# Patient Record
Sex: FEMALE | Race: WHITE | NOT HISPANIC OR LATINO
[De-identification: names, ages, dates, MRNs, and addresses within clinical notes are randomized per-mention and may not be internally consistent; named-entity substitution may affect disease eponyms.]

---

## 2022-06-03 ENCOUNTER — APPOINTMENT (OUTPATIENT)
Dept: ORTHOPEDIC SURGERY | Facility: CLINIC | Age: 57
End: 2022-06-03
Payer: COMMERCIAL

## 2022-06-03 VITALS — WEIGHT: 120 LBS | HEIGHT: 60 IN | BODY MASS INDEX: 23.56 KG/M2

## 2022-06-03 DIAGNOSIS — Z78.9 OTHER SPECIFIED HEALTH STATUS: ICD-10-CM

## 2022-06-03 PROBLEM — Z00.00 ENCOUNTER FOR PREVENTIVE HEALTH EXAMINATION: Status: ACTIVE | Noted: 2022-06-03

## 2022-06-03 PROCEDURE — 73562 X-RAY EXAM OF KNEE 3: CPT | Mod: LT

## 2022-06-03 PROCEDURE — 99214 OFFICE O/P EST MOD 30 MIN: CPT

## 2022-06-03 NOTE — PHYSICAL EXAM
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

## 2022-06-03 NOTE — HISTORY OF PRESENT ILLNESS
[Gradual] : gradual [9] : 9 [7] : 7 [Constant] : constant [de-identified] : Following up today on LT knee pain. States her mobility and pain is worse than her last visit Aug 21 2020. NKI. pain has been worse, has been putting off knee replacement. No prior care for Lt. knee. Patient reports using voltarin gel and CBD cream, doesn’t help. Reports clicking locking and giving out [FreeTextEntry1] : LT knee

## 2022-06-03 NOTE — ASSESSMENT
[FreeTextEntry1] : Lengthy discussion regarding options was had with the patient. Nonsurgical options including but not limited to cortisone, viscosupplementation, anti-inflammatory medications, activity modification, obtaining/maintaining a healthy BMI, bracing, and icing were reviewed. Surgical options including but not limited to arthroscopy, and joint replacement were discussed as was risks, benefits and alternatives. All questions were answered.  \par \par Lengthy discussion with the patient was had regarding the potential risks/complications of surgery including but not limited to bleeding, blood clot/dvt, infection, nerve/artery damage, and death. Pt understands and all questions were answered. \par \par Plan is for patient to think about when they would like to go forward with LT. TKA \par \par f/u PRN

## 2022-06-13 ENCOUNTER — APPOINTMENT (OUTPATIENT)
Dept: ORTHOPEDIC SURGERY | Facility: CLINIC | Age: 57
End: 2022-06-13
Payer: COMMERCIAL

## 2022-06-13 PROCEDURE — 99214 OFFICE O/P EST MOD 30 MIN: CPT

## 2022-06-13 NOTE — DISCUSSION/SUMMARY
[de-identified] : The Risks, benefits, alternatives and expectations of the proposed procedure, as well as non-operative management, were discussed at length with the patient, as well as the procedure itself and the expected recovery period. The possible need for post operative Physical Therapy was also discussed. The patient was allowed as much tome as necessary to ask all appropriate questions and they were answered to the patient's satisfaction. \par Risks involving the TKA were discussed and include infection, bleeding, anesthesia complications, NV injury, fracture, dislocation, DVT/PE. \par \par Plan is to go forward with Left TKA

## 2022-06-13 NOTE — HISTORY OF PRESENT ILLNESS
[de-identified] : Patient is here for the left knee. Patient has been seeing Dr. Ramos and was told she needed a TKA. She had Left knee subchondroplasty by Dr Ramos several yrs ago with temp relief.  She states the knee has become worse and severe and she wants to consider TKA.  SHe has severe pain daily and pain is diffuse at the knee.

## 2022-06-13 NOTE — PHYSICAL EXAM
[FreeTextEntry9] : ap/lat/sunrise taken on 6/3/22 show medial joint bone on bone contact, patelalr spurring.

## 2022-07-21 ENCOUNTER — FORM ENCOUNTER (OUTPATIENT)
Age: 57
End: 2022-07-21

## 2022-07-21 ENCOUNTER — RESULT REVIEW (OUTPATIENT)
Age: 57
End: 2022-07-21

## 2022-07-24 ENCOUNTER — FORM ENCOUNTER (OUTPATIENT)
Age: 57
End: 2022-07-24

## 2022-08-09 ENCOUNTER — FORM ENCOUNTER (OUTPATIENT)
Age: 57
End: 2022-08-09

## 2022-08-12 ENCOUNTER — RESULT REVIEW (OUTPATIENT)
Age: 57
End: 2022-08-12

## 2022-08-12 ENCOUNTER — APPOINTMENT (OUTPATIENT)
Dept: ORTHOPEDIC SURGERY | Facility: HOSPITAL | Age: 57
End: 2022-08-12
Payer: COMMERCIAL

## 2022-08-12 PROCEDURE — 27447 TOTAL KNEE ARTHROPLASTY: CPT | Mod: LT

## 2022-08-12 PROCEDURE — 20985 CPTR-ASST DIR MS PX: CPT

## 2022-08-12 PROCEDURE — 27447 TOTAL KNEE ARTHROPLASTY: CPT | Mod: AS,LT

## 2022-08-12 PROCEDURE — 20610 DRAIN/INJ JOINT/BURSA W/O US: CPT | Mod: LT,59

## 2022-08-16 ENCOUNTER — RESULT REVIEW (OUTPATIENT)
Age: 57
End: 2022-08-16

## 2022-08-22 ENCOUNTER — RESULT CHARGE (OUTPATIENT)
Age: 57
End: 2022-08-22

## 2022-08-22 ENCOUNTER — APPOINTMENT (OUTPATIENT)
Dept: ORTHOPEDIC SURGERY | Facility: CLINIC | Age: 57
End: 2022-08-22

## 2022-08-22 PROCEDURE — 73560 X-RAY EXAM OF KNEE 1 OR 2: CPT | Mod: LT

## 2022-08-22 PROCEDURE — 99024 POSTOP FOLLOW-UP VISIT: CPT

## 2022-08-22 NOTE — PHYSICAL EXAM
[Left] : left knee [No loss of surgical correlation. Bony alignment acceptable. Hardware in appropriate position] : No loss of surgical correlation. Bony alignment acceptable. Hardware in appropriate position

## 2022-08-25 RX ORDER — HYDROMORPHONE HYDROCHLORIDE 2 MG/1
2 TABLET ORAL
Qty: 28 | Refills: 0 | Status: ACTIVE | COMMUNITY
Start: 2022-08-25 | End: 1900-01-01

## 2022-08-31 ENCOUNTER — FORM ENCOUNTER (OUTPATIENT)
Age: 57
End: 2022-08-31

## 2022-09-06 RX ORDER — HYDROMORPHONE HYDROCHLORIDE 2 MG/1
2 TABLET ORAL
Qty: 28 | Refills: 0 | Status: ACTIVE | COMMUNITY
Start: 2022-09-06 | End: 1900-01-01

## 2022-09-15 NOTE — HISTORY OF PRESENT ILLNESS
[de-identified] : following up for the left knee. Patient is s/p L TKA 8/12/2022.  Patient still notes burning medial aspect of the knee and calf swelling. She had doppler LLE which was neg for DVT. She is ambulating with walker and she is taking her ASA BID. She is uysing TEDS but XL because the L size was causing tingling to the LLE.  She is working with PT at home and gets to 90 flexion

## 2022-09-15 NOTE — DISCUSSION/SUMMARY
[de-identified] : SHE WILL CONTINUE PT AND USE THE BONE FOAM. NO RESTRICTIONS.  SHE NEEDS TO PUSH FLEXION.  SHE WANTS TO TAKE AN RX FOR DOPPLER TO R/O DVT.  WBAT LLE.  SHE WILL REMOVE MESH THIS FRIDAY. INSTRUCTIONS WERE GIVEN F/U IN 1 MOS. PATIENT D/W DR VASQUEZ

## 2022-09-19 RX ORDER — OXYCODONE 5 MG/1
5 TABLET ORAL
Qty: 30 | Refills: 0 | Status: ACTIVE | COMMUNITY
Start: 2022-09-19 | End: 1900-01-01

## 2022-09-21 ENCOUNTER — APPOINTMENT (OUTPATIENT)
Dept: ORTHOPEDIC SURGERY | Facility: CLINIC | Age: 57
End: 2022-09-21

## 2022-09-21 PROCEDURE — 99024 POSTOP FOLLOW-UP VISIT: CPT

## 2022-09-21 NOTE — HISTORY OF PRESENT ILLNESS
[de-identified] : following up for the left knee. Patient is s/p L TKA 8/12/2022.  Patient states the knee is still swollen and she notes "burning to the knee".  She also has noted some tingling to the lower leg since surgery.  She states she was going to PT since post op but did not go since this past Friday . She was tending to her daughter.

## 2022-09-21 NOTE — DISCUSSION/SUMMARY
[de-identified] : She needs to conitnue PT no restrictions. She needs to use bone foam and push flexion as well.  \par She complains of parestehsia over the medial aspect of the knee and medial calf

## 2022-09-30 RX ORDER — OXYCODONE 5 MG/1
5 TABLET ORAL
Qty: 35 | Refills: 0 | Status: ACTIVE | COMMUNITY
Start: 2022-09-30 | End: 1900-01-01

## 2022-11-01 ENCOUNTER — FORM ENCOUNTER (OUTPATIENT)
Age: 57
End: 2022-11-01

## 2022-11-02 ENCOUNTER — APPOINTMENT (OUTPATIENT)
Dept: ORTHOPEDIC SURGERY | Facility: CLINIC | Age: 57
End: 2022-11-02

## 2022-11-02 PROCEDURE — 99024 POSTOP FOLLOW-UP VISIT: CPT

## 2022-11-02 RX ORDER — OXYCODONE 5 MG/1
5 TABLET ORAL
Qty: 30 | Refills: 0 | Status: ACTIVE | COMMUNITY
Start: 2022-11-02 | End: 1900-01-01

## 2022-11-02 NOTE — HISTORY OF PRESENT ILLNESS
[de-identified] : following for the left knee. Patient is s/p L TKA 8/12/2022.  Patient still notes swelling and pain at the knee.  She states she has been improving with PT with her ROM. She notes her ambulation has improved.  She would like pain medication just to take at  night.

## 2022-11-02 NOTE — DISCUSSION/SUMMARY
[de-identified] : Options were discussed including continuing PT, manipulation, poly exchange.  She has a 12 poly.  \par Her ROM seems to have a soft end point. \par \par She wants to work harder with PT\par and delay any surgery until after the holidays\par

## 2022-11-03 ENCOUNTER — FORM ENCOUNTER (OUTPATIENT)
Age: 57
End: 2022-11-03

## 2022-11-07 ENCOUNTER — FORM ENCOUNTER (OUTPATIENT)
Age: 57
End: 2022-11-07

## 2023-01-11 ENCOUNTER — APPOINTMENT (OUTPATIENT)
Dept: ORTHOPEDIC SURGERY | Facility: CLINIC | Age: 58
End: 2023-01-11
Payer: COMMERCIAL

## 2023-01-11 PROCEDURE — 99213 OFFICE O/P EST LOW 20 MIN: CPT

## 2023-01-11 NOTE — PHYSICAL EXAM
[Left] : left knee [5___] : hamstring 5[unfilled]/5 [] : no sign of infection [FreeTextEntry9] : flexion to 85 degrees [de-identified] : extension 3 degrees

## 2023-01-11 NOTE — DISCUSSION/SUMMARY
[de-identified] : Discussed options including continued PT, manipulation, poly exchange. She has a 12 mm poly. Plan is to observe for now and she will f/u in 2 mos. \par \par The following information has been documented by Sonal Nelson acting as a scribe.\par Dr. Norton Attestation\par The documentation recorded by the scribe, in my presence, accurately reflects the service I, Dr. Norton, personally performed, and the decisions made by me with my edits as appropriate.\par \par

## 2023-01-11 NOTE — HISTORY OF PRESENT ILLNESS
[de-identified] : following for the left knee. Patient is s/p L TKA 8/12/2022.   Patient still notes swelling and pain at the knee. She is still working at the knee ROM and gets to 95 flexion.  She has difficulty with stairs and daily activity.  She does feels better when compared to last visit. She is going to PT TIW

## 2023-01-11 NOTE — HISTORY OF PRESENT ILLNESS
[de-identified] : following for the left knee. Patient is s/p L TKA 8/12/2022.   Patient still notes swelling and pain at the knee. She is still working at the knee ROM and gets to 95 flexion.  She has difficulty with stairs and daily activity.  She does feels better when compared to last visit. She is going to PT TIW

## 2023-01-11 NOTE — DISCUSSION/SUMMARY
[de-identified] : Discussed options including continued PT, manipulation, poly exchange. She has a 12 mm poly. Plan is to observe for now and she will f/u in 2 mos. \par \par The following information has been documented by Sonal Nelson acting as a scribe.\par Dr. Norton Attestation\par The documentation recorded by the scribe, in my presence, accurately reflects the service I, Dr. Norton, personally performed, and the decisions made by me with my edits as appropriate.\par \par

## 2023-01-11 NOTE — PHYSICAL EXAM
[Left] : left knee [5___] : hamstring 5[unfilled]/5 [] : no sign of infection [FreeTextEntry9] : flexion to 85 degrees [de-identified] : extension 3 degrees

## 2023-02-26 ENCOUNTER — FORM ENCOUNTER (OUTPATIENT)
Age: 58
End: 2023-02-26

## 2023-02-28 ENCOUNTER — FORM ENCOUNTER (OUTPATIENT)
Age: 58
End: 2023-02-28

## 2023-03-07 ENCOUNTER — FORM ENCOUNTER (OUTPATIENT)
Age: 58
End: 2023-03-07

## 2023-03-13 ENCOUNTER — APPOINTMENT (OUTPATIENT)
Dept: ORTHOPEDIC SURGERY | Facility: CLINIC | Age: 58
End: 2023-03-13
Payer: COMMERCIAL

## 2023-03-13 DIAGNOSIS — M17.12 UNILATERAL PRIMARY OSTEOARTHRITIS, LEFT KNEE: ICD-10-CM

## 2023-03-13 PROCEDURE — 99213 OFFICE O/P EST LOW 20 MIN: CPT

## 2023-03-13 RX ORDER — TRAMADOL HYDROCHLORIDE 50 MG/1
50 TABLET, COATED ORAL
Qty: 15 | Refills: 0 | Status: ACTIVE | COMMUNITY
Start: 2023-03-13 | End: 1900-01-01

## 2023-03-13 NOTE — PHYSICAL EXAM
[Left] : left knee [5___] : hamstring 5[unfilled]/5 [] : no sign of infection [FreeTextEntry9] : flexion to 85 degrees [de-identified] : extension 3 degrees

## 2023-03-13 NOTE — HISTORY OF PRESENT ILLNESS
[de-identified] : following up for the left knee. Patient is scheduled for left knee manipulation under anesthesia and poly exchange 3/17/2023.\par Patient states she has been working with PT but still does not note any improvement in ROM when not in PT\par She still notes swelling of the knee.  She states after warming up with PT they get up to 105 degrees flexion

## 2023-03-13 NOTE — DISCUSSION/SUMMARY
[de-identified] : The Risks, benefits, alternatives and expectations of the proposed procedure, as well as non-operative management, were discussed at length with the patient, as well as the procedure itself and the expected recovery period. The possible need for post operative Physical Therapy was also discussed. The patient was allowed as much tome as necessary to ask all appropriate questions and they were answered to the patient's satisfaction. \par 
94.3

## 2023-03-16 RX ORDER — FAMOTIDINE 20 MG/1
20 TABLET ORAL
Qty: 28 | Refills: 0 | Status: ACTIVE | COMMUNITY
Start: 2023-03-16 | End: 1900-01-01

## 2023-03-16 RX ORDER — CEPHALEXIN 500 MG/1
500 TABLET ORAL
Qty: 4 | Refills: 0 | Status: ACTIVE | COMMUNITY
Start: 2023-03-16 | End: 1900-01-01

## 2023-03-16 RX ORDER — ASPIRIN 325 MG/1
325 TABLET, FILM COATED ORAL TWICE DAILY
Qty: 28 | Refills: 0 | Status: ACTIVE | COMMUNITY
Start: 2023-03-16 | End: 1900-01-01

## 2023-03-16 RX ORDER — CELECOXIB 200 MG/1
200 CAPSULE ORAL TWICE DAILY
Qty: 28 | Refills: 0 | Status: ACTIVE | COMMUNITY
Start: 2023-03-16 | End: 1900-01-01

## 2023-03-17 ENCOUNTER — APPOINTMENT (OUTPATIENT)
Dept: ORTHOPEDIC SURGERY | Facility: AMBULATORY SURGERY CENTER | Age: 58
End: 2023-03-17
Payer: COMMERCIAL

## 2023-03-17 PROCEDURE — 27486 REVISE/REPLACE KNEE JOINT: CPT | Mod: LT

## 2023-03-17 PROCEDURE — 27570 FIXATION OF KNEE JOINT: CPT | Mod: 59,LT

## 2023-03-17 PROCEDURE — 27486 REVISE/REPLACE KNEE JOINT: CPT | Mod: AS,LT

## 2023-03-17 RX ORDER — IBUPROFEN 50 MG/1.25
8.6 SUSPENSION, DROPS(FINAL DOSAGE FORM)(ML) ORAL
Qty: 14 | Refills: 0 | Status: ACTIVE | COMMUNITY
Start: 2023-03-17 | End: 1900-01-01

## 2023-03-27 ENCOUNTER — APPOINTMENT (OUTPATIENT)
Dept: ORTHOPEDIC SURGERY | Facility: CLINIC | Age: 58
End: 2023-03-27
Payer: COMMERCIAL

## 2023-03-27 PROCEDURE — 99024 POSTOP FOLLOW-UP VISIT: CPT

## 2023-03-27 PROCEDURE — 73560 X-RAY EXAM OF KNEE 1 OR 2: CPT | Mod: LT

## 2023-03-29 NOTE — HISTORY OF PRESENT ILLNESS
[de-identified] : following up for the left knee. Patient is s/p revision of the left tibial component, manipulation under anesthesia 3/17/2023.  Patient denies any fever chills or drainage from the knee. She still notes swelling of the knee which may be restricting her ROM.  She has reyes working with PT TIW.

## 2023-03-29 NOTE — DISCUSSION/SUMMARY
[de-identified] : She can use TEDS.  Sh ewill WBAT and push with ROM especially extension. f/u in 6 weeks. \par She can remove the mesh in 4 days.

## 2023-03-29 NOTE — PHYSICAL EXAM
[Left] : left knee [Components well fixed, in good position] : Components well fixed, in good position [de-identified] : Constitutional: The patient appears well developed, well nourished.  \par \par  \par \par Neurologic: Coordination is normal. Alert and oriented to time, place and person. No evidence of mood disorder, calm affect.  \par \par  \par \par    LEFT   KNEE: Inspection of the knee is as follows: moderate effusion and small ecchymosis. no streaking, no erythema, no atrophy, no deformities of the quad tendon and no deformities of patellar tendon.  \par \par  \par \par Inspection of the wound reveals clean and dry incision, no fluctuance, no sign of infection, no erythema and no drainage. Mesh is intact. \par \par  \par \par Palpation of the knee is as follows: no increased warmth. \par \par  \par \par Knee Range of Motion is as follows in degrees:   \par \par  \par \par Extension: 5 \par \par Flexion: 110  \par \par  \par \par Strength examination of the knee is as follows:  \par \par Quadriceps strength is 5/5  \par \par Hamstring strength is 5/5  \par \par  \par \par Ligament Stability and Special Test ligamentously stable and no varus or valgus instability. patella tracks well and able to do active straight leg raise without an extensor lag.  \par \par  \par \par Neurological examination of the knee is as follows: light touch is intact throughout.  \par \par  \par \par Gait and function is as follows: mildly antalgic gait.  \par \par

## 2023-05-10 ENCOUNTER — APPOINTMENT (OUTPATIENT)
Dept: ORTHOPEDIC SURGERY | Facility: CLINIC | Age: 58
End: 2023-05-10
Payer: COMMERCIAL

## 2023-05-10 DIAGNOSIS — Z96.652 PRESENCE OF LEFT ARTIFICIAL KNEE JOINT: ICD-10-CM

## 2023-05-10 PROCEDURE — 99024 POSTOP FOLLOW-UP VISIT: CPT

## 2023-05-10 NOTE — PHYSICAL EXAM
[Left] : left knee [Components well fixed, in good position] : Components well fixed, in good position [de-identified] : Constitutional: The patient appears well developed, well nourished.  \par \par  \par \par Neurologic: Coordination is normal. Alert and oriented to time, place and person. No evidence of mood disorder, calm affect.  \par \par  \par \par    LEFT   KNEE: Inspection of the knee is as follows: moderate effusion and small ecchymosis. no streaking, no erythema, no atrophy, no deformities of the quad tendon and no deformities of patellar tendon.  \par \par  \par \par Inspection of the wound reveals clean and dry incision, no fluctuance, no sign of infection, no erythema and no drainage. Mesh is intact. \par \par  \par \par Palpation of the knee is as follows: no increased warmth. \par \par  \par \par Knee Range of Motion is as follows in degrees:   \par \par  \par \par Extension: 0 \par \par Flexion: 115  \par \par  \par \par Strength examination of the knee is as follows:  \par \par Quadriceps strength is 5/5  \par \par Hamstring strength is 5/5  \par \par  \par \par Ligament Stability and Special Test ligamentously stable and no varus or valgus instability. patella tracks well and able to do active straight leg raise without an extensor lag.  \par \par  \par \par Neurological examination of the knee is as follows: light touch is intact throughout.  \par \par  \par \par Gait and function is as follows: mildly antalgic gait.  \par \par

## 2023-05-10 NOTE — HISTORY OF PRESENT ILLNESS
[de-identified] : following up for the left knee. Patient is s/p revision of the left tibial component, manipulation under anesthesia 3/17/2023.

## 2023-05-10 NOTE — DISCUSSION/SUMMARY
[de-identified] : Options were discussed with patient. She will continue PT at this time. Also was shown some home exercises to improve ROM. Stressed the importance of pushing ROM before strength at this time.\par \par Patient was given anotherPT script at this time. Taking pain meds still from PCP.\par \par f/u 6 weeks \par \par Entered by Luan Loaiza acting as scribe.\par Dr. Norton Attestation\par The documentation recorded by the scribe, in my presence, accurately reflects the service I, Dr. Norton, personally performed, and the decisions made by me with my edits as appropriate.\par

## 2023-05-11 RX ORDER — HYDROMORPHONE HYDROCHLORIDE 2 MG/1
2 TABLET ORAL
Qty: 28 | Refills: 0 | Status: ACTIVE | COMMUNITY
Start: 2023-03-16 | End: 1900-01-01

## 2023-06-06 ENCOUNTER — FORM ENCOUNTER (OUTPATIENT)
Age: 58
End: 2023-06-06

## 2023-07-12 ENCOUNTER — APPOINTMENT (OUTPATIENT)
Dept: ORTHOPEDIC SURGERY | Facility: CLINIC | Age: 58
End: 2023-07-12

## 2023-07-26 ENCOUNTER — APPOINTMENT (OUTPATIENT)
Dept: ORTHOPEDIC SURGERY | Facility: CLINIC | Age: 58
End: 2023-07-26
Payer: COMMERCIAL

## 2023-07-26 VITALS — BODY MASS INDEX: 23.56 KG/M2 | HEIGHT: 60 IN | WEIGHT: 120 LBS

## 2023-07-26 DIAGNOSIS — M24.662 ANKYLOSIS, LEFT KNEE: ICD-10-CM

## 2023-07-26 PROCEDURE — 99212 OFFICE O/P EST SF 10 MIN: CPT

## 2023-07-26 NOTE — DISCUSSION/SUMMARY
[de-identified] : pt was still feeling tight as per [facility] it can take 1 year for the knee to be completely healed. \par \par Options were discussed today including oral anti-inflammatories, Physical Therapy, Steroid Injection, Hyalgan injections or Surgery. The patient had time to ask questions of the different treatment options, including doing nothing and just observing for a finite period of time and re-evaluating in the future. \par \par Pt is out of PT visits and will continue to do a HEP.\par \par Entered by Nanette Vail acting as scribe.\par Dr. Norton Attestation\par The documentation recorded by the scribe, in my presence, accurately reflects the service I, Dr. Norton, personally performed, and the decisions made by me with my edits as appropriate.

## 2023-07-26 NOTE — HISTORY OF PRESENT ILLNESS
[de-identified] : follow up for the left knee. Patient had revision of the left tibial component, manipulation on 3/17/2023. Patient states she has tightness and swelling. Patient states she finished PT but feels she needs her ROM to improve. Patient states she is taking Aleve for pain.  Patient states PT was stopped approx 1 mos ago. She was told she needed Letter of med necessity to get more PT.  She feels she cant bend the knee more than she would like and she cant walk downstairs correctly.  [Consultation] : a consultation visit

## 2023-07-26 NOTE — PHYSICAL EXAM
[de-identified] : Constitutional: The patient appears well developed, well nourished.  \par \par  \par \par Neurologic: Coordination is normal. Alert and oriented to time, place and person. No evidence of mood disorder, calm affect.  \par \par  \par \par    LEFT   KNEE: Inspection of the knee is as follows: MILD  effusion and NO ecchymosis. no streaking, no erythema, no atrophy, no deformities of the quad tendon and no deformities of patellar tendon.  \par \par  \par \par Inspection of the wound reveals : WOUNDS WELL HEALED NO SIGNS OF INFECTION NOTED> \par  \par \par Palpation of the knee is as follows: no increased warmth. \par \par  \par \par Knee Range of Motion is as follows in degrees:   \par \par  \par \par Extension: 2 \par \par Flexion: 105  \par \par  \par \par Strength examination of the knee is as follows:  \par \par Quadriceps strength is 5/5  \par \par Hamstring strength is 5/5  \par \par  \par \par Ligament Stability and Special Test ligamentously stable and no varus or valgus instability. patella tracks well and able to do active straight leg raise without an extensor lag.  \par \par  \par \par Neurological examination of the knee is as follows: light touch is intact throughout.  \par \par  \par \par Gait and function is as follows: mildly antalgic gait.  \par \par

## 2023-09-27 ENCOUNTER — APPOINTMENT (OUTPATIENT)
Dept: ORTHOPEDIC SURGERY | Facility: CLINIC | Age: 58
End: 2023-09-27